# Patient Record
(demographics unavailable — no encounter records)

---

## 2025-02-05 NOTE — HISTORY OF PRESENT ILLNESS
[FreeTextEntry1] : Initial office visit April 13, 2021: This is a 34-year-old woman who presents today for evaluation of migraine headache. She states she's been getting headaches since she was a young woman. More recently starting in January they have increased in frequency. She has what she calls headaches as well as migraine-type headaches. Migraine headaches are characterized by severe headache with photophobia nausea with dry heaves no vomiting occasional visual or precedes them. She needs to lay down in a dark room and they're quite incapacitating. She utilizes Tylenol and Excedrin without much relief. Since January she's been averaging 3 of these headaches per month. Prior to that they were much less. Since January she has kept track of how many headaches she was having. She does endorse additional stress in her life since January. She is here today for a neurologic evaluation.  Followup June 14, 2021: This is a 34-year-old woman who presents today for followup of migraine headache. Since being seen she's tried this she was tripped hand which cause a reaction of tightening in the jaw and she was unable to tolerate it. She continues to have headaches so slightly worse. She is having on average 4 headaches per month each of which last one to 2 days. She is therefore having 4-8 headache days per month. She currently is in the middle of a migraine headache and actually vomited this morning. She continues to have photophobia nausea with vomiting with the headaches. The Zofran helps a bit with the vomiting. She is here today asking for preventive medication as we discussed at her initial visit.  Followup August 16, 2021: This is a 35-year-old woman who presents today for followup of migraine headache. Her migraines have a lowered in frequency. She attributes this to the discontinuation of her nuvaring and the addition of a progesterone only IUD for birth control. Her last migraine headache lasted about one to 2 hours and she was able to sleep it off. She's tried a rizatriptan but it doesn't seem to be helping. She is currently getting about one headache or 2 headaches per month. Her amitriptyline had been increased to 25 mg daily. She is here today for neurologic followup.  Follow-up June 25, 2024: This is a 37-year-old woman who presents today with migraine headache.  She was last seen about 3 years ago.  She has been on amitriptyline 25 mg a day and Nurtec 75 mg as needed for migraine headache.  She recently lost her mother.  During the time when her mother was sick her migraine frequency increased due to stress however when she passed the headaches frequency has lowered.  She is currently having headaches about 2-3 times per month.  She is tolerating the amitriptyline well.  The Nurtec helps to abort the breakthrough headaches.  She is here today for neurologic follow-up.  Follow-up February 5, 2025: This is a 38-year-old woman who presents today with migraine headache.  She has been having increasingly worsening migraine headaches.  She now goes through 6-8 headaches per month, this is up from 2-3 headaches per month at her last visit in June 2024.  She is taking amitriptyline 50 mg for prevention and she is taking Nurtec for abortive therapy.  She is already also taking Lexapro.  She is here today for neurologic follow-up.

## 2025-02-05 NOTE — ASSESSMENT
[FreeTextEntry1] : This is a 38-year-old woman with migraine headache.  They are getting worse and amitriptyline does not appear to be working well.  At this point I do not want to increase her amitriptyline above 50 mg daily as she is taking Lexapro.  I will start Ajovy injection for migraine.  She can continue to take Nurtec as needed for abortive therapy.  I will see her back in 6 months for routine neurologic follow-up and continued care of her migraine headaches, sooner should the need arise.

## 2025-02-05 NOTE — CONSULT LETTER
[Dear  ___] : Dear  [unfilled], [Courtesy Letter:] : I had the pleasure of seeing your patient, [unfilled], in my office today. [Please see my note below.] : Please see my note below. [Consult Closing:] : Thank you very much for allowing me to participate in the care of this patient.  If you have any questions, please do not hesitate to contact me. [Sincerely,] : Sincerely, [FreeTextEntry3] : Shan Lora M.D., Ph.D. DPN-N [facility] [place] Director, Division of Neurology Director, Comprehensive Stroke Center [facility]

## 2025-02-05 NOTE — PHYSICAL EXAM
[Person] : oriented to person [Place] : oriented to place [Time] : oriented to time [Remote Intact] : remote memory intact [Registration Intact] : recent registration memory intact [Span Intact] : the attention span was normal [Concentration Intact] : normal concentrating ability [Visual Intact] : visual attention was ~T not ~L decreased [Naming Objects] : no difficulty naming common objects [Repeating Phrases] : no difficulty repeating a phrase [Fluency] : fluency intact [Comprehension] : comprehension intact [Current Events] : adequate knowledge of current events [Past History] : adequate knowledge of personal past history [Cranial Nerves Optic (II)] : visual acuity intact bilaterally,  visual fields full to confrontation, pupils equal round and reactive to light [Cranial Nerves Oculomotor (III)] : extraocular motion intact [Cranial Nerves Trigeminal (V)] : facial sensation intact symmetrically [Cranial Nerves Facial (VII)] : face symmetrical [Cranial Nerves Vestibulocochlear (VIII)] : hearing was intact bilaterally [Cranial Nerves Glossopharyngeal (IX)] : tongue and palate midline [Cranial Nerves Hypoglossal (XII)] : there was no tongue deviation with protrusion [Cranial Nerves Accessory (XI - Cranial And Spinal)] : head turning and shoulder shrug symmetric [Motor Tone] : muscle tone was normal in all four extremities [Involuntary Movements] : no involuntary movements were seen [Motor Strength] : muscle strength was normal in all four extremities [No Muscle Atrophy] : normal bulk in all four extremities [Paresis Pronator Drift Right-Sided] : no pronator drift on the right [Paresis Pronator Drift Left-Sided] : no pronator drift on the left [Motor Strength Upper Extremities Bilaterally] : strength was normal in both upper extremities [Motor Strength Lower Extremities Bilaterally] : strength was normal in both lower extremities [Sensation Tactile Decrease] : light touch was intact [Sensation Pain / Temperature Decrease] : pain and temperature was intact [Sensation Vibration Decrease] : vibration was intact [Proprioception] : proprioception was intact [Abnormal Walk] : normal gait [Balance] : balance was intact [Tremor] : no tremor present [Coordination - Dysmetria Impaired Finger-to-Nose Bilateral] : not present [2+] : Patella left 2+ [PERRL With Normal Accommodation] : pupils were equal in size, round, reactive to light, with normal accommodation [Sclera] : the sclera and conjunctiva were normal [Extraocular Movements] : extraocular movements were intact [No APD] : no afferent pupillary defect [No KAREN] : no internuclear ophthalmoplegia [Full Visual Field] : full visual field

## 2025-03-08 NOTE — PLAN
[FreeTextEntry1] : Well woman exam  pap done  return in 1 year I have asked pt to have previous mammo/sono faxed here tender left breast lump palpated-dx mammo and sono-will do bilateral Pt would like to return to meet MD to discuss her past OB hx -I rec. this

## 2025-03-08 NOTE — HISTORY OF PRESENT ILLNESS
[FreeTextEntry1] : 39 yo,,, here for initial visit. She is here for annual exam and pap. Last pap was normal in . She had a mammography and breast sono in  because she felt a lump in left breast. She sates her periods are g 28 days,last 5-7 days, changes pads every 2 hours.  She does not use contraception at this time, did well with kylena in the past. She may want to conceive in the near future. She had a bad experience with [facility] and is hoping for better care here. 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  She has a medical h/o Hashimoto's, hemochromatosis, depression/anxiety. She takes Lexapro, trazadone, nurtec, emgality and wegovy. She is allergic to fentanyl, Ryza and sumatriptan family h/o(M)Gm ovarian cacner age 72-PT had negative genetic testing in . Mother  age 73 of multiple myeloma, father has Demetia  Many years ago had +hpv on a pap, since they have been nml. H/o migraine headaches  with aura followed by neurologist yearly.

## 2025-03-08 NOTE — PHYSICAL EXAM
[Chaperone Declined] : Patient declined chaperone [Appropriately responsive] : appropriately responsive [Alert] : alert [No Acute Distress] : no acute distress [Oriented x3] : oriented x3 [Examination Of The Breasts] : a normal appearance [Labia Majora] : normal [Labia Minora] : normal [Normal] : normal [Uterine Adnexae] : normal [Tenderness Of The Left Breast] : tenderness [___cm] : a ~M [unfilled] ~Ucm inferior lateral quadrant mass was palpated

## 2025-06-24 NOTE — CONSULT LETTER
[FreeTextEntry3] : Shan Lora M.D., Ph.D. DPN-N [facility] [place] Director, Division of Neurology Director, Comprehensive Stroke Center [facility]

## 2025-06-24 NOTE — HISTORY OF PRESENT ILLNESS
[FreeTextEntry1] : Initial office visit April 13, 2021: This is a 34-year-old woman who presents today for evaluation of migraine headache. She states she's been getting headaches since she was a young woman. More recently starting in January they have increased in frequency. She has what she calls headaches as well as migraine-type headaches. Migraine headaches are characterized by severe headache with photophobia nausea with dry heaves no vomiting occasional visual or precedes them. She needs to lay down in a dark room and they're quite incapacitating. She utilizes Tylenol and Excedrin without much relief. Since January she's been averaging 3 of these headaches per month. Prior to that they were much less. Since January she has kept track of how many headaches she was having. She does endorse additional stress in her life since January. She is here today for a neurologic evaluation.  Followup June 14, 2021: This is a 34-year-old woman who presents today for followup of migraine headache. Since being seen she's tried this she was tripped hand which cause a reaction of tightening in the jaw and she was unable to tolerate it. She continues to have headaches so slightly worse. She is having on average 4 headaches per month each of which last one to 2 days. She is therefore having 4-8 headache days per month. She currently is in the middle of a migraine headache and actually vomited this morning. She continues to have photophobia nausea with vomiting with the headaches. The Zofran helps a bit with the vomiting. She is here today asking for preventive medication as we discussed at her initial visit.  Followup August 16, 2021: This is a 35-year-old woman who presents today for followup of migraine headache. Her migraines have a lowered in frequency. She attributes this to the discontinuation of her nuvaring and the addition of a progesterone only IUD for birth control. Her last migraine headache lasted about one to 2 hours and she was able to sleep it off. She's tried a rizatriptan but it doesn't seem to be helping. She is currently getting about one headache or 2 headaches per month. Her amitriptyline had been increased to 25 mg daily. She is here today for neurologic followup.  Follow-up June 25, 2024: This is a 37-year-old woman who presents today with migraine headache.  She was last seen about 3 years ago.  She has been on amitriptyline 25 mg a day and Nurtec 75 mg as needed for migraine headache.  She recently lost her mother.  During the time when her mother was sick her migraine frequency increased due to stress however when she passed the headaches frequency has lowered.  She is currently having headaches about 2-3 times per month.  She is tolerating the amitriptyline well.  The Nurtec helps to abort the breakthrough headaches.  She is here today for neurologic follow-up.  Follow-up February 5, 2025: This is a 38-year-old woman who presents today with migraine headache.  She has been having increasingly worsening migraine headaches.  She now goes through 6-8 headaches per month, this is up from 2-3 headaches per month at her last visit in June 2024.  She is taking amitriptyline 50 mg for prevention and she is taking Nurtec for abortive therapy.  She is already also taking Lexapro.  She is here today for neurologic follow-up.  Follow-up June 24, 2025: This is a 38-year-old woman who presents today with migraine headaches.  At her last visit we had changed her over to Emgality.  Since then her headaches are much better.  She is having about 1 headache per month for which she takes Nurtec which helps.  This is down from a reported 6-8 headaches per month in February.  She is seen by video visit at her request today for neurologic follow-up.

## 2025-06-24 NOTE — ASSESSMENT
[FreeTextEntry1] : This is a 38-year-old woman with migraine headaches.  She is doing well on Emgality with them being very well-controlled.  I will continue this for her.  She will continue to take Nurtec as needed for breakthrough headaches.  I will see her back in 6 months, sooner should the need arise.  We did briefly have a conversation about pregnancy.  If she does decide to get pregnant she will have to stop the Emgality and Nurtec.  Typically sumatriptan is relatively safe for use in pregnant women however she has had reaction or her throat close to it in the past and this is not advised.  She will have to likely use Tylenol and possibly steroid Dosepaks if okay with OB/GYN for prolonged headaches while pregnant.

## 2025-06-24 NOTE — PHYSICAL EXAM
[FreeTextEntry1] : Neurologic examination was limited due to the video call.  Mental status: Awake and alert fully oriented to person place and time. There is no aphasia.  Cranial nerves:  Full extraocular movements. There is no nystagmus. Normal facial sensation and motor function. Tongue was in the midline.  Motor: no pronator drift  bilaterally.  Sensation: Light touch was intact   Coordination: Deferred  Gait: Deferred